# Patient Record
(demographics unavailable — no encounter records)

---

## 2024-12-16 NOTE — PHYSICAL EXAM
[Normal Conjunctiva] : normal conjunctiva [Normal Venous Pressure] : normal venous pressure [No Carotid Bruit] : no carotid bruit [Normal S1, S2] : normal S1, S2 [No Murmur] : no murmur [Normal PT B/L] : normal PT B/L [Normal DP B/L] : normal DP B/L [Edema ___] : edema [unfilled] [Normal] : alert and oriented, normal memory [Soft] : abdomen soft [Non Tender] : non-tender [No Edema] : no edema [No Rash] : no rash

## 2024-12-17 NOTE — DISCUSSION/SUMMARY
[EKG obtained to assist in diagnosis and management of assessed problem(s)] : EKG obtained to assist in diagnosis and management of assessed problem(s) [FreeTextEntry1] : 80 F with dyslipidemia, elevated Lp(a), nonobstructive CAD (CCTA 2016), HFpEF and HTN presents for follow up.   CAD/HLD, elevated Lp(a) 142.6:  (CCTA 12/16) revealed CAC = 293; pLAD nonobstructive mixed calcific and noncalcific plaque; mLAD borderline moderate stenosis due to calcific plaque, nuclear (2021) + EKG, nl perfusion goal LDL < 70 - LDL was 73 in August; will recheck lipids today  - c/w Crestor 20mg, Zetia 10mg qd  - Encouraged patient to continue healthy exercise and eating habits, focusing on a Mediterranean style of eating and aiming for the recommended 150 minutes per week of moderate physical activity  HFpEF, Pedal edema: -BNP elevated and EF borderline at 56% from echo this summer (7/24)  - will add in Toprol 25mg qd  - c/w Lasix 20mg qd   HTN: well controlled today  - c/w regimen as above    One month telehealth f/u RTC 6 months.

## 2024-12-17 NOTE — HISTORY OF PRESENT ILLNESS
[FreeTextEntry1] : 80 F with dyslipidemia, elevated Lp(a), nonobstructive CAD (CCTA 2016), HFpEF and HTN presents for follow up.   Pt was last seen in August by KAUSHIK Ellsworth. She was c/o leg swelling. Echo revealed borderline EF at 56%. We discussed lowering or stopping amlodipine and starting diuretic, and pt was concerned about increased urination. However, she ultimately agreed to lower to 5mg of amlodipine and added Lasix 20mg qd. We also advised supportive care including compression socks, elevating legs, etc.   Since then, she reports feeling well. Spiriva helps her breathing. Patient denies any chest pain, SOB, palpitations, orthopnea, PND or LE edema. She has been using the compression socks and asks about stopping now that the meds have helped w/ the swelling.  She reports occasional dizziness when waking up in the AM that started occurring since her f/u this summer.   =================================== PCP: Dr. Bradley Cheatham  Labs/Diagnostics:  Lipid profile (12/11/2023): TAG 43, , , LDL 77 A1c (12/11/2023): 5.7%  Stress echo 12/11/23 CONCLUSIONS 1. Normal exercise stress echocardiogram with normal augmentation of left ventricular systolic function. 2. No echocardiographic evidence of exercise induced ischemia. 3. No electrocardiographic evidence of ischemia at or near maximal predicted heart rate. 4. Physiologic blood pressure response to exercise. 5. Average functional capacity. 6. Arrythmias: Frequent APD's occured during stress.  Echo 1/29/2021- Echo- nl LV & RV size & fx, dilated RA, no significant valvular disease, no pericardial effusion  NUC 9/2021: myocardial perfusion imaging is normal; EKG stress test is positive; Teran Treadmill score is 2  CCTA 12/2016: CAC = 293; pLAD nonobstructive mixed calcific and noncalcific plaque; mLAD borderline moderate stenosis due to calcific plaque

## 2025-01-13 NOTE — CARDIOLOGY SUMMARY
[de-identified] : Holter 12/15 - 12/16/16: Sinus rhythm with frequent PVCs 12.3%. Longest episodes of nonsustained AT of 7 beats   [de-identified] : EKG 12/2024: sinus bradycardia, isolated PAC  [de-identified] : Exercise stress echocardiogram 12/2023: normal exercise stress echo with normal augmentation of LV systolic function. No echocardiographic evidence of exercise induced ischemia. Average functional capacity (7.1 METS). Frequent APDs occurred during stress   [de-identified] : TTE 8/2024: limited study with off axis views, LV 3.5 cm, normal biventricular function, no LVH, possible biatrial enlargment but hard to estimate given off axis views, IVD not well visualized, PASP 29 mmHg,   LVIDd 3.5 cm, LVEF 56%, normal diastolic function, mild LVH (septum 1, PWT 1), normal RV size and function (TAPSE 1.7 cm), normal biatrial size, trace MR, mild TR, est PASP 29 mmHg, IVC normal size   [de-identified] : CCTA 12/2016: LM Agatston score 0, LAD Agatston score 184, LCx Agatston score 109, RCA Agatston score 0. Total Agatston score moderately elevated at 293 which is the 83rd percentile.    [de-identified] : [facility] 10/2013: normal LM, mild luminal irregularities of LAD/LCx/RCA, LVEDP 4 mmHg

## 2025-01-13 NOTE — HISTORY OF PRESENT ILLNESS
[FreeTextEntry1] : Referring: Dr. Gmoez   Ms. Marroquin is a 80 year old woman with a history of HFpEF, nonobstructive CAD, HTN, HLD, and COPD who presents to the HF/cardiomyopathy clinic for further evaluation  ==============  For several years, has noted SOB both at rest and exercise which was attributed to pulmonary disease. Ischemic workup in 2013 via [facility] unrevealing and repeat in 2016 via CCTA consistent with mild non-obstructive CAD. More recently in August 2024, she developed LE swelling and noted to have very mild elevated in pro-BNP for which loop diuretics were initiated and Amlodipine dose reduced with subsequent improvement in both symptoms and normalized pro-BNP. She has not had HF hospitalizations.  ==============  She presents today for evaluation. She reports she started furosemide 20 mg daily about ~1 month ago, she has not taken this yet today. She has not noted a substantial improvement in her breathing since starting the furosemide but has noted increased urine output. She lives on [facility] and previously walking 10-15 blocks one year ago. But at present feels there has been a decline. She reports she [place] the bus and the subway here today she managed the stairs without dyspnea and her primary limitation is arthralgias (carpel tunnel, unilateral), knee pain, hip pain, and shoulder pain. But can comfortably manage 5 blocks. Admits to occasional LH / dizziness not associated with positional changes. Her LE edema has resolved on furosemide and lower doses of amlodipine. Now wearing compression socks daily too.   She denies CP, SOB at rest, orthopnea, PND, abdominal discomfort, palpitations, and syncope. Her appetite is normal.  Denies early prandial fullness. No hx of joint replacement. Hx of unilateral carpel tunnel.

## 2025-01-13 NOTE — PHYSICAL EXAM
[No Xanthelasma] : no xanthelasma [Normal Venous Pressure] : normal venous pressure [Normal] : clear lung fields, good air entry, no respiratory distress [Soft] : abdomen soft [Non Tender] : non-tender [No Masses/organomegaly] : no masses/organomegaly [Normal Gait] : normal gait [Moves all extremities] : moves all extremities [de-identified] : MMM, no perioral cyanosis [de-identified] : JVP < 6 cm of H2O, no HJR [de-identified] : BLE 1+ pitting edema to pretibial region R> L , wearing knee high b/l compression socks [de-identified] : warm

## 2025-01-13 NOTE — ASSESSMENT
[FreeTextEntry1] : 80 year old woman with a history of HFpEF, nonobstructive CAD, HTN, HLD, COPD, and daily ETOH use who presents to the HF/cardiomyopathy clinic for further evaluation  She is endorsing NYHA Class II symptoms and is euvolemic on exam.

## 2025-01-13 NOTE — PHYSICAL EXAM
[No Xanthelasma] : no xanthelasma [Normal Venous Pressure] : normal venous pressure [Normal] : clear lung fields, good air entry, no respiratory distress [Soft] : abdomen soft [Non Tender] : non-tender [No Masses/organomegaly] : no masses/organomegaly [Normal Gait] : normal gait [Moves all extremities] : moves all extremities [de-identified] : MMM, no perioral cyanosis [de-identified] : JVP < 6 cm of H2O, no HJR [de-identified] : BLE 1+ pitting edema to pretibial region R> L , wearing knee high b/l compression socks [de-identified] : warm

## 2025-01-13 NOTE — HISTORY OF PRESENT ILLNESS
[FreeTextEntry1] : Referring: Dr. Gomez   Ms. Marroquin is a 80 year old woman with a history of HFpEF, nonobstructive CAD, HTN, HLD, and COPD who presents to the HF/cardiomyopathy clinic for further evaluation  ==============  For several years, has noted SOB both at rest and exercise which was attributed to pulmonary disease. Ischemic workup in 2013 via [facility] unrevealing and repeat in 2016 via CCTA consistent with mild non-obstructive CAD. More recently in August 2024, she developed LE swelling and noted to have very mild elevated in pro-BNP for which loop diuretics were initiated and Amlodipine dose reduced with subsequent improvement in both symptoms and normalized pro-BNP. She has not had HF hospitalizations.  ==============  She presents today for evaluation. She reports she started furosemide 20 mg daily about ~1 month ago, she has not taken this yet today. She has not noted a substantial improvement in her breathing since starting the furosemide but has noted increased urine output. She lives on [facility] and previously walking 10-15 blocks one year ago. But at present feels there has been a decline. She reports she took the bus and the subway here today she managed the stairs without dyspnea and her primary limitation is arthralgias (carpel tunnel, unilateral), knee pain, hip pain, and shoulder pain. But can comfortably manage 5 blocks. Admits to occasional LH / dizziness not associated with positional changes. Her LE edema has resolved on furosemide and lower doses of amlodipine. Now wearing compression socks daily too.   She denies CP, SOB at rest, orthopnea, PND, abdominal discomfort, palpitations, and syncope. Her appetite is normal.  Denies early prandial fullness. No hx of joint replacement. Hx of unilateral carpel tunnel.

## 2025-01-13 NOTE — DISCUSSION/SUMMARY
[EKG obtained to assist in diagnosis and management of assessed problem(s)] : EKG obtained to assist in diagnosis and management of assessed problem(s) [FreeTextEntry1] : # HFpEF - Appears to have a very low risk phenotype of HFpEF. TTE without signs of restrictive physiology or cardiac amyloid. proBNP is normal and she has not had hospitalizations for heart failure  - GDMT:  will start Jardiance 10 mg daily switch amlodipine entresto 24-26 mg BID. will consider MRA based on labs next week  - Diuretics: will stop lasix after starting ARNI/SGLT2i - Will stop metoprolol out of concern for possible subclinical chronotropic incompetence  - Labs from 12/16 with K 4.2, Cr 0.5 and normalized pro- (from 350 in August). will obtain labs next week to follow K and consider MRA initiation   # HTN - adjusting regimen to more HF specific regimen as above   # CAD, nonobstructive - On ASA, Crestor 20 mg QD and Zetia 10 mg QD - Lipid panel from 12/18/24 with LDL of 71 - Followed by Dr. Allen   Return to clinic in 6 months with ACP and 1 year with me

## 2025-01-13 NOTE — CARDIOLOGY SUMMARY
[de-identified] : EKG 12/2024: sinus bradycardia, isolated PAC  [de-identified] : Holter 12/15 - 12/16/16: Sinus rhythm with frequent PVCs 12.3%. Longest episodes of nonsustained AT of 7 beats   [de-identified] : Exercise stress echocardiogram 12/2023: normal exercise stress echo with normal augmentation of LV systolic function. No echocardiographic evidence of exercise induced ischemia. Average functional capacity (7.1 METS). Frequent APDs occurred during stress   [de-identified] : TTE 8/2024: limited study with off axis views, LV 3.5 cm, normal biventricular function, no LVH, possible biatrial enlargment but hard to estimate given off axis views, IVD not well visualized, PASP 29 mmHg,   LVIDd 3.5 cm, LVEF 56%, normal diastolic function, mild LVH (septum 1, PWT 1), normal RV size and function (TAPSE 1.7 cm), normal biatrial size, trace MR, mild TR, est PASP 29 mmHg, IVC normal size   [de-identified] : CCTA 12/2016: LM Agatston score 0, LAD Agatston score 184, LCx Agatston score 109, RCA Agatston score 0. Total Agatston score moderately elevated at 293 which is the 83rd percentile.    [de-identified] : [facility] 10/2013: normal LM, mild luminal irregularities of LAD/LCx/RCA, LVEDP 4 mmHg

## 2025-01-13 NOTE — SOCIAL HISTORY
[TextEntry] : Remote hx of narcotics / RADHA in remission ~2015 Vape User current daily user. Alcohol Previously drinking ( ~4 drinks daily, now drinking 3 servings of spirits). Works full time as musician

## 2025-01-13 NOTE — CARDIOLOGY SUMMARY
[de-identified] : EKG 12/2024: sinus bradycardia, isolated PAC  [de-identified] : Holter 12/15 - 12/16/16: Sinus rhythm with frequent PVCs 12.3%. Longest episodes of nonsustained AT of 7 beats   [de-identified] : Exercise stress echocardiogram 12/2023: normal exercise stress echo with normal augmentation of LV systolic function. No echocardiographic evidence of exercise induced ischemia. Average functional capacity (7.1 METS). Frequent APDs occurred during stress   [de-identified] : TTE 8/2024: limited study with off axis views, LV 3.5 cm, normal biventricular function, no LVH, possible biatrial enlargment but hard to estimate given off axis views, IVD not well visualized, PASP 29 mmHg,   LVIDd 3.5 cm, LVEF 56%, normal diastolic function, mild LVH (septum 1, PWT 1), normal RV size and function (TAPSE 1.7 cm), normal biatrial size, trace MR, mild TR, est PASP 29 mmHg, IVC normal size   [de-identified] : CCTA 12/2016: LM Agatston score 0, LAD Agatston score 184, LCx Agatston score 109, RCA Agatston score 0. Total Agatston score moderately elevated at 293 which is the 83rd percentile.    [de-identified] : [facility] 10/2013: normal LM, mild luminal irregularities of LAD/LCx/RCA, LVEDP 4 mmHg

## 2025-01-13 NOTE — PHYSICAL EXAM
[No Xanthelasma] : no xanthelasma [Normal Venous Pressure] : normal venous pressure [Normal] : clear lung fields, good air entry, no respiratory distress [Soft] : abdomen soft [Non Tender] : non-tender [No Masses/organomegaly] : no masses/organomegaly [Normal Gait] : normal gait [Moves all extremities] : moves all extremities [de-identified] : MMM, no perioral cyanosis [de-identified] : JVP < 6 cm of H2O, no HJR [de-identified] : BLE 1+ pitting edema to pretibial region R> L , wearing knee high b/l compression socks [de-identified] : warm

## 2025-01-13 NOTE — SOCIAL HISTORY
[TextEntry] : Remote hx of narcotics / RADHA in remission ~2015 Vape User current daily user. Alcohol Previously drinking ( ~4 drinks daily, now drinking 3 servings of spirits). Works full time as musician  Drysol Counseling:  I discussed with the patient the risks of drysol/aluminum chloride including but not limited to skin rash, itching, irritation, burning.

## 2025-03-11 NOTE — REVIEW OF SYSTEMS
[Lower Ext Edema] : lower extremity edema [Fever] : no fever [Chills] : no chills [SOB] : no shortness of breath [Dyspnea on exertion] : not dyspnea during exertion [Chest Discomfort] : no chest discomfort [Palpitations] : no palpitations [Orthopnea] : no orthopnea [PND] : no PND [Syncope] : no syncope [Cough] : no cough [Nausea] : no nausea [Vomiting] : no vomiting [Diarrhea] : diarrhea [Dizziness] : no dizziness [Numbness (Hypoesthesia)] : no numbness [Weakness] : no weakness [Easy Bleeding] : no tendency for easy bleeding

## 2025-03-11 NOTE — PHYSICAL EXAM
[Well Developed] : well developed [Well Nourished] : well nourished [No Acute Distress] : no acute distress [Normal Venous Pressure] : normal venous pressure [No Carotid Bruit] : no carotid bruit [Normal S1, S2] : normal S1, S2 [No Murmur] : no murmur [No Rub] : no rub [No Gallop] : no gallop [Clear Lung Fields] : clear lung fields [Good Air Entry] : good air entry [No Respiratory Distress] : no respiratory distress  [Soft] : abdomen soft [Non Tender] : non-tender [No Masses/organomegaly] : no masses/organomegaly [Normal Bowel Sounds] : normal bowel sounds [Normal Gait] : normal gait [No Edema] : no edema [No Cyanosis] : no cyanosis [No Clubbing] : no clubbing [No Varicosities] : no varicosities [Moves all extremities] : moves all extremities [No Focal Deficits] : no focal deficits [Normal Speech] : normal speech

## 2025-03-11 NOTE — DISCUSSION/SUMMARY
[FreeTextEntry1] : 80F with dyslipidemia, elevated Lp(a), nonobstructive CAD (CCTA 2016), HFpEF and HTN presents for follow up.   CAD/HLD, elevated Lp(a) 142.6:  (CCTA 12/16) revealed CAC = 293; pLAD nonobstructive mixed calcific and noncalcific plaque; mLAD borderline moderate stenosis due to calcific plaque goal LDL < 70, she is slightly above this - increase crestor from 20mg to 40mg daily for additional LDL lowering - c/w Zetia 10mg qd  - c/w ASA 81mg  - Encouraged patient to continue healthy exercise and eating habits, focusing on a Mediterranean style of eating and aiming for the recommended 150 minutes per week of moderate physical activity  HFpEF Last TTE Sept 2024.  - Continue with Entresto 24-26 BID, Jardiance 10mg daily, spironolactone 25mg daily - continue f/u with HF team   RTC in 6 months for follow up with PA  RTC 12 months for in person follow up with Dr. Allen.  [EKG obtained to assist in diagnosis and management of assessed problem(s)] : EKG obtained to assist in diagnosis and management of assessed problem(s)

## 2025-03-11 NOTE — HISTORY OF PRESENT ILLNESS
[FreeTextEntry1] : 80 F with dyslipidemia, elevated Lp(a), nonobstructive CAD (CCTA 2016), HFpEF and HTN presents for follow up.   Feels well at this time. Reports adherence with medications.  No SOB, chest pain, LAI, orthopnea, PND. Reports some limitation while walking as she has not been able to see her chiropractor for a while. Reports she has decreased her alcohol consumption a lot.   ======================================= Since last visit, she has followed with Dr Fitzpatrick. She the Lasix was discontinued and she was started on Entresto / Jardiance 10mg. Metoprolol and Amlodipine has been discontinued as well. =================================== PCP: Dr. Bradley Cheatham  Labs/Diagnostics:  Feb 2025 K 5.0 12/16/2024 , lipids , , LDL 71 Lipid profile (12/11/2023): TAG 43, , , LDL 77 A1c (12/11/2023): 5.7%  Sept 2024 TTE - nl LV & RV fx, mild LVH, mild TR, no pericardial effusion; EF 56%  Stress echo 12/11/23 -- Normal exercise stress echocardiogram with normal augmentation of left ventricular systolic function. Echo 1/29/2021- Echo- nl LV & RV size & fx, dilated RA, no significant valvular disease, no pericardial effusion  NUC 9/2021: myocardial perfusion imaging is normal; EKG stress test is positive; Teran Treadmill score is 2  CCTA 12/2016: CAC = 293; pLAD nonobstructive mixed calcific and noncalcific plaque; mLAD borderline moderate stenosis due to calcific plaque